# Patient Record
Sex: FEMALE | Race: OTHER | HISPANIC OR LATINO | Employment: UNEMPLOYED | URBAN - METROPOLITAN AREA
[De-identification: names, ages, dates, MRNs, and addresses within clinical notes are randomized per-mention and may not be internally consistent; named-entity substitution may affect disease eponyms.]

---

## 2023-10-26 ENCOUNTER — OFFICE VISIT (OUTPATIENT)
Dept: OBSTETRICS AND GYNECOLOGY | Facility: CLINIC | Age: 28
End: 2023-10-26

## 2023-10-26 VITALS — DIASTOLIC BLOOD PRESSURE: 68 MMHG | HEART RATE: 114 BPM | WEIGHT: 136.38 LBS | SYSTOLIC BLOOD PRESSURE: 114 MMHG

## 2023-10-26 DIAGNOSIS — N90.89 VULVAR LESION: Primary | ICD-10-CM

## 2023-10-26 DIAGNOSIS — N39.0 ACUTE UTI: ICD-10-CM

## 2023-10-26 LAB
BILIRUB SERPL-MCNC: NEGATIVE MG/DL
BLOOD URINE, POC: NORMAL
CLARITY, POC UA: CLEAR
COLOR, POC UA: YELLOW
GLUCOSE UR QL STRIP: NEGATIVE
KETONES UR QL STRIP: NEGATIVE
LEUKOCYTE ESTERASE URINE, POC: NORMAL
NITRITE, POC UA: NEGATIVE
PH, POC UA: 7.5
PROTEIN, POC: NEGATIVE
SPECIFIC GRAVITY, POC UA: 1.01
UROBILINOGEN, POC UA: NORMAL

## 2023-10-26 PROCEDURE — 99203 OFFICE O/P NEW LOW 30 MIN: CPT | Mod: S$GLB,,, | Performed by: NURSE PRACTITIONER

## 2023-10-26 PROCEDURE — 81002 POCT URINE DIPSTICK WITHOUT MICROSCOPE: ICD-10-PCS | Mod: S$GLB,,, | Performed by: NURSE PRACTITIONER

## 2023-10-26 PROCEDURE — 81002 URINALYSIS NONAUTO W/O SCOPE: CPT | Mod: S$GLB,,, | Performed by: NURSE PRACTITIONER

## 2023-10-26 PROCEDURE — 99203 PR OFFICE/OUTPT VISIT, NEW, LEVL III, 30-44 MIN: ICD-10-PCS | Mod: S$GLB,,, | Performed by: NURSE PRACTITIONER

## 2023-10-26 RX ORDER — LEVONORGESTREL AND ETHINYL ESTRADIOL 0.1-0.02MG
KIT ORAL
COMMUNITY
Start: 2023-09-19

## 2023-10-26 RX ORDER — NITROFURANTOIN 25; 75 MG/1; MG/1
100 CAPSULE ORAL 2 TIMES DAILY
Qty: 14 CAPSULE | Refills: 0 | Status: SHIPPED | OUTPATIENT
Start: 2023-10-26 | End: 2023-11-02

## 2023-10-26 NOTE — PROGRESS NOTES
Subjective:      Patient ID: Radha Ferreira is a 27 y.o. female.    Chief Complaint:  Vaginal Itching (Pt states for about four months now she has a very itchy spot on her labia. She had a culture done about a month ago and was positive for BV. She was treated with an antibiotic, but she said it never fully went away.) and Vaginal burning (Pt states for one week she has been having bad burning every time after she uses the bathroom. It lasts for about ten minutes.)      History of Present Illness  Vaginal Itching  The patient's pertinent negatives include no pelvic pain or vaginal discharge. Associated symptoms include dysuria. Pertinent negatives include no abdominal pain, back pain, chills, constipation, fever, flank pain, frequency, headaches, hematuria, nausea, rash, urgency or vomiting. There is no history of menorrhagia.     Vaginal itching x 4 mos, was treated for BV and got some relief but it is back. Reports vaginal pain with urination for about 10 minutes. She is using an OTC itch vream for relief    Outpatient Medications Marked as Taking for the 10/26/23 encounter (Office Visit) with Angie Sandoval NP   Medication Sig Dispense Refill    levonorgestrel-ethinyl estradiol (AVIANE,ALESSE,LESSINA) 0.1-20 mg-mcg per tablet        Vitals:    10/26/23 1456   BP: 114/68   Pulse: (!) 114   Weight: 61.9 kg (136 lb 6.4 oz)     History reviewed. No pertinent past medical history.  History reviewed. No pertinent surgical history.    GYN & OB History  No LMP recorded (within weeks). (Menstrual status: Birth Control).   Date of Last Pap: No result found    OB History    Para Term  AB Living   0 0 0 0 0 0   SAB IAB Ectopic Multiple Live Births   0 0 0 0 0       Review of Systems  Review of Systems   Constitutional:  Negative for activity change, appetite change, chills, fatigue and fever.   HENT:  Negative for nasal congestion and tinnitus.    Eyes:  Negative for visual disturbance.   Respiratory:   Negative for cough and shortness of breath.    Cardiovascular:  Negative for chest pain and palpitations.   Gastrointestinal:  Negative for abdominal pain, bloating, blood in stool, constipation, nausea and vomiting.   Endocrine: Negative for diabetes, hair loss and hot flashes.   Genitourinary:  Positive for dysuria and vaginal pain (itching). Negative for bladder incontinence, decreased libido, dysmenorrhea, dyspareunia, flank pain, frequency, genital sores, hematuria, hot flashes, menorrhagia, menstrual problem, pelvic pain, urgency, vaginal bleeding, vaginal discharge, urinary incontinence, postcoital bleeding, postmenopausal bleeding, vaginal dryness and vaginal odor.   Musculoskeletal:  Negative for arthralgias, back pain, leg pain and myalgias.   Integumentary:  Negative for rash, acne, hair changes, mole/lesion, breast mass, nipple discharge, breast skin changes and breast tenderness.   Neurological:  Negative for vertigo, syncope, numbness and headaches.   Hematological:  Does not bruise/bleed easily.   Psychiatric/Behavioral:  Negative for depression and sleep disturbance. The patient is not nervous/anxious.    Breast: Negative for asymmetry, lump, mass, mastodynia, nipple discharge, skin changes and tenderness         Objective:     Physical Exam:   Constitutional: Vital signs are normal. She appears well-developed and well-nourished.    HENT:   Nose: No epistaxis.              Genitourinary:    Vagina and uterus normal.            Pelvic exam was performed with patient supine.   Labial bartholins normal.Cervix is normal. Right adnexum displays no mass, no tenderness and no fullness. Left adnexum displays no mass, no tenderness and no fullness. No erythema,  no vaginal discharge, tenderness, bleeding, rectocele, cystocele or unspecified prolapse of vaginal walls in the vagina.    No foreign body in the vagina.      No signs of injury in the vagina.   Cervix exhibits no motion tenderness, no discharge and  no friability.                       Chaperone present    Assessment:     1. Vulvar lesion    2. Acute UTI               Plan:     Vulvar lesion  Call if it is not better and we can try aldera    Acute UTI  -     POCT Urinalysis, Dipstick, Automated, W/O Scope  -     nitrofurantoin, macrocrystal-monohydrate, (MACROBID) 100 MG capsule; Take 1 capsule (100 mg total) by mouth 2 (two) times daily. for 7 days  Dispense: 14 capsule; Refill: 0  Patient instructed to:   Increase oral fluid intake  Avoid consumption of bladder irritants such as, alcohol, carbonated beverages, etc.   Void immediately after intercourse if sexually active   Empty bladder at regular and frequent intervals    Patient education provided on the following topics:  Maintaining proper perineal hygiene  Taking showers versus baths  Avoiding heavily perfumed soaps and using a milder soap, such as Dove or Dial  Wearing loose non-restrictive clothing  Wearing cotton underwear  Use of Pyridium OTC as needed for symptom relief   Importance of completing the ENTIRE course of the prescribed antibiotic, even if symptoms resolve before, to ensure eradication of infection and assisting in prevention of recurrent infection and antimicrobial resistance    Instructed to call or return to clinic as needed if symptoms worsen or fail to improve as anticipated.      Please abstain or use back up protection while on abx    .follow